# Patient Record
Sex: MALE | Race: WHITE | Employment: FULL TIME | URBAN - METROPOLITAN AREA
[De-identification: names, ages, dates, MRNs, and addresses within clinical notes are randomized per-mention and may not be internally consistent; named-entity substitution may affect disease eponyms.]

---

## 2018-08-10 ENCOUNTER — APPOINTMENT (OUTPATIENT)
Dept: CT IMAGING | Age: 23
End: 2018-08-10
Payer: COMMERCIAL

## 2018-08-10 ENCOUNTER — HOSPITAL ENCOUNTER (EMERGENCY)
Age: 23
Discharge: HOME | End: 2018-08-10
Attending: EMERGENCY MEDICINE
Payer: COMMERCIAL

## 2018-08-10 VITALS
TEMPERATURE: 98.3 F | DIASTOLIC BLOOD PRESSURE: 95 MMHG | RESPIRATION RATE: 18 BRPM | WEIGHT: 270 LBS | OXYGEN SATURATION: 98 % | HEART RATE: 90 BPM | HEIGHT: 73 IN | BODY MASS INDEX: 35.78 KG/M2 | SYSTOLIC BLOOD PRESSURE: 166 MMHG

## 2018-08-10 DIAGNOSIS — R10.9 RIGHT FLANK PAIN: Primary | ICD-10-CM

## 2018-08-10 LAB
BILIRUBIN URINE: NEGATIVE
BLOOD, URINE: NEGATIVE
CLARITY: CLEAR
COLOR: YELLOW
GLUCOSE URINE: NEGATIVE MG/DL
KETONES, URINE: NEGATIVE MG/DL
LEUKOCYTE ESTERASE, URINE: NEGATIVE
NITRITE, URINE: NEGATIVE
PH UA: 6 (ref 5–9)
PROTEIN UA: NEGATIVE MG/DL
SPECIFIC GRAVITY UA: 1.02 (ref 1–1.03)
UROBILINOGEN, URINE: 0.2 E.U./DL

## 2018-08-10 PROCEDURE — 99284 EMERGENCY DEPT VISIT MOD MDM: CPT

## 2018-08-10 PROCEDURE — 81003 URINALYSIS AUTO W/O SCOPE: CPT

## 2018-08-10 PROCEDURE — 74176 CT ABD & PELVIS W/O CONTRAST: CPT

## 2018-08-10 RX ORDER — KETOROLAC TROMETHAMINE 30 MG/ML
30 INJECTION, SOLUTION INTRAMUSCULAR; INTRAVENOUS ONCE
Status: DISCONTINUED | OUTPATIENT
Start: 2018-08-10 | End: 2018-08-10 | Stop reason: HOSPADM

## 2018-08-10 RX ORDER — KETOROLAC TROMETHAMINE 10 MG/1
10 TABLET, FILM COATED ORAL EVERY 8 HOURS PRN
Qty: 15 TABLET | Refills: 0 | Status: SHIPPED | OUTPATIENT
Start: 2018-08-10

## 2018-08-10 ASSESSMENT — PAIN DESCRIPTION - LOCATION: LOCATION: ABDOMEN

## 2018-08-10 ASSESSMENT — PAIN SCALES - GENERAL: PAINLEVEL_OUTOF10: 5

## 2018-08-10 ASSESSMENT — PAIN DESCRIPTION - PAIN TYPE: TYPE: ACUTE PAIN

## 2018-08-10 ASSESSMENT — PAIN DESCRIPTION - ORIENTATION: ORIENTATION: RIGHT

## 2018-08-10 NOTE — ED PROVIDER NOTES
Department of Emergency Medicine   ED  Provider Note  Admit Date/RoomTime: 8/10/2018  2:17 AM  ED Room: 23/23          History of Present Illness:  8/10/18, Time: 2:29 AM         Ly Arauz is a 20 y.o. male presenting to the ED for flank pain, beginning tonight. The complaint has been persistent, moderate in severity, and worsened by nothing. The pt reports noticing pain in his right side earlier this evening at work. He describes initially having an \"annoying pain,\" gradually worsening. He states pain waxes and wanes in intensity. Currently rates the pain 6/10. Per pt, pain does not radiate anywhere. He states he can feel the pain when he takes a deep breath in. Pt denies any recent travel or surgery, edema, calf pain, injury, LOC, headache, weakness, numbness, dizziness, fever, chills, cough, chest pain, sob, abdominal pain, nausea, emesis, diarrhea, urinary sx, or any further complaints at this time. Review of Systems:   Pertinent positives and negatives are stated within HPI, all other systems reviewed and are negative.      --------------------------------------------- PAST HISTORY ---------------------------------------------  Past Medical History:  has no past medical history on file. Past Surgical History:  has no past surgical history on file. Social History:  reports that he has never smoked. He has never used smokeless tobacco. He reports that he does not drink alcohol or use drugs. Family History: family history is not on file. The patients home medications have been reviewed. Allergies: Patient has no known allergies.       ---------------------------------------------------PHYSICAL EXAM--------------------------------------    Constitutional/General: Alert and oriented x3, well appearing, non toxic in NAD  Head: Normocephalic and atraumatic  Eyes: PERRL, EOMI, conjunctiva normal  Mouth: Oropharynx clear, handling secretions, no asymmetry of the posterior oropharynx

## 2025-07-11 ENCOUNTER — APPOINTMENT (OUTPATIENT)
Dept: GENERAL RADIOLOGY | Age: 30
End: 2025-07-11
Payer: COMMERCIAL

## 2025-07-11 ENCOUNTER — HOSPITAL ENCOUNTER (EMERGENCY)
Age: 30
Discharge: HOME | End: 2025-07-11
Payer: COMMERCIAL

## 2025-07-11 VITALS
DIASTOLIC BLOOD PRESSURE: 83 MMHG | OXYGEN SATURATION: 98 % | HEIGHT: 71 IN | RESPIRATION RATE: 16 BRPM | HEART RATE: 88 BPM | SYSTOLIC BLOOD PRESSURE: 156 MMHG | WEIGHT: 315 LBS | TEMPERATURE: 98.1 F | BODY MASS INDEX: 44.1 KG/M2

## 2025-07-11 DIAGNOSIS — L03.113 CELLULITIS OF RIGHT HAND: Primary | ICD-10-CM

## 2025-07-11 DIAGNOSIS — Z51.89 VISIT FOR WOUND CHECK: ICD-10-CM

## 2025-07-11 DIAGNOSIS — S61.259A DOG BITE OF MULTIPLE SITES OF RIGHT HAND AND FINGERS, INITIAL ENCOUNTER: ICD-10-CM
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
## 2025-07-11 DIAGNOSIS — Z23 NEED FOR TETANUS BOOSTER: ICD-10-CM

## 2025-07-11 DIAGNOSIS — R03.0 ELEVATED BLOOD PRESSURE READING WITHOUT DIAGNOSIS OF HYPERTENSION: ICD-10-CM

## 2025-07-11 DIAGNOSIS — S61.451A DOG BITE OF MULTIPLE SITES OF RIGHT HAND AND FINGERS, INITIAL ENCOUNTER: ICD-10-CM

## 2025-07-11 DIAGNOSIS — S60.111A CONTUSION OF RIGHT THUMB NAIL, INITIAL ENCOUNTER: ICD-10-CM

## 2025-07-11 PROCEDURE — 90471 IMMUNIZATION ADMIN: CPT

## 2025-07-11 PROCEDURE — 6360000002 HC RX W HCPCS

## 2025-07-11 PROCEDURE — 6370000000 HC RX 637 (ALT 250 FOR IP)

## 2025-07-11 PROCEDURE — 90714 TD VACC NO PRESV 7 YRS+ IM: CPT

## 2025-07-11 PROCEDURE — 73130 X-RAY EXAM OF HAND: CPT

## 2025-07-11 PROCEDURE — 99284 EMERGENCY DEPT VISIT MOD MDM: CPT

## 2025-07-11 RX ORDER — IBUPROFEN 600 MG/1
600 TABLET, FILM COATED ORAL 3 TIMES DAILY PRN
Qty: 21 TABLET | Refills: 0 | Status: SHIPPED | OUTPATIENT
Start: 2025-07-11

## 2025-07-11 RX ORDER — BACITRACIN ZINC 500 [USP'U]/G
OINTMENT TOPICAL ONCE
Status: COMPLETED | OUTPATIENT
Start: 2025-07-11 | End: 2025-07-11

## 2025-07-11 RX ORDER — IBUPROFEN 600 MG/1
600 TABLET, FILM COATED ORAL ONCE
Status: COMPLETED | OUTPATIENT
Start: 2025-07-11 | End: 2025-07-11

## 2025-07-11 RX ADMIN — AMOXICILLIN AND CLAVULANATE POTASSIUM 1 TABLET: 875; 125 TABLET, FILM COATED ORAL at 23:00

## 2025-07-11 RX ADMIN — CLOSTRIDIUM TETANI TOXOID ANTIGEN (FORMALDEHYDE INACTIVATED) AND CORYNEBACTERIUM DIPHTHERIAE TOXOID ANTIGEN (FORMALDEHYDE INACTIVATED) 0.5 ML: 5; 2 INJECTION, SUSPENSION INTRAMUSCULAR at 23:00

## 2025-07-11 RX ADMIN — BACITRACIN ZINC: 500 OINTMENT TOPICAL at 23:00

## 2025-07-11 RX ADMIN — IBUPROFEN 600 MG: 600 TABLET ORAL at 23:00

## 2025-07-11 ASSESSMENT — PAIN - FUNCTIONAL ASSESSMENT: PAIN_FUNCTIONAL_ASSESSMENT: NONE - DENIES PAIN

## 2025-07-11 ASSESSMENT — LIFESTYLE VARIABLES
HOW MANY STANDARD DRINKS CONTAINING ALCOHOL DO YOU HAVE ON A TYPICAL DAY: PATIENT DOES NOT DRINK
HOW OFTEN DO YOU HAVE A DRINK CONTAINING ALCOHOL: NEVER

## 2025-07-12 NOTE — ED PROVIDER NOTES
Independent CODIE Visit          [facility] EMERGENCY DEPARTMENT  EMERGENCY DEPARTMENT ENCOUNTER        Pt Name: Junior Marin  MRN: [id number]  Birthdate 1995  Date of evaluation: 7/11/2025  Provider: NICKOLAS Mirza - WIN  PCP: Charli Coelho DO  Note Started: 8:51 PM EDT 7/11/25    CHIEF COMPLAINT       Chief Complaint   Patient presents with    Animal Bite     Anterior right hand bit by own dog, puncture. Needs tetanus update       HISTORY OF PRESENT ILLNESS: 1 or more Elements   History from : Patient and chart review  Limitations to history : None    Junior Marin is a 29 y.o. male *** who presents to the emergency department for a dog bite to his right hand and thumb, which occured 1 day(s) prior to arrival.  Was trying to break up a fight between his own dogs when this occurred.  Was bitten by large breed dog.  Did clean the area well.  Has had increased pain, some swelling, and redness which prompted his visit.  Area is mild to moderately sore.  Pain is aggravated by certain movements and pressure on the area.    Symptoms:    Pain:   yes.      Redness:   yes.     Pruritis:   no.     Rash:   no.     Swelling:   yes.     Laceration:   yes.     Abrasion:   no.     Pustule:   no.     Puncture:   yes.     Other:   Bruising to nailbed of right thumb.     Tetanus Status:  more than 10 years ago.    Rabies Risk Assessment:    Dog:   yes     Cat:   no.     Rodent:   no.     Bat:   no.     Other:   N/A.     If Animal Related, Then;                   Abnormal Behavior Witnessed:  NA.                  Geographic Location Where Bitten:  N/A.                  Immunization Status of Animal:  up to date.    Associated Signs & Symptoms:    Fever/Chills:   no.     Swelling:   yes.     Drainage:   no.       Nursing Notes were all reviewed and agreed with or any disagreements were addressed in the HPI.    REVIEW OF SYSTEMS :      Review of Systems    Positives and Pertinent negatives as  treatment management and/or medical review.      All Images taken on 7/11/2025 of patient name: Junior Marin were taken by a [employer] approved registered mobile device via [employer] mobile application and transmitted then stored on a secured I-DISPO  Site located within Media Folder.        DIAGNOSTIC RESULTS   LABS:    No results found for this visit on 07/11/25.    RADIOLOGY:   Images reviewed but not interpreted by the ED provider. Interpretation per the Radiologist below, if available at the time of this note:    XR HAND RIGHT (MIN 3 VIEWS)    (Results Pending)       PROCEDURES      None    CRITICAL CARE TIME      None    MEDICAL DECISION MAKING   ***    Physical Exam: ***    Record Review ***      Differential diagnoses included but not limited to ***.    Diagnostics obtained: ***    *** interpreted by ***.    Results: ***    Medications Given / Offered:   Analgesic(s) PO ibuprofen 600 mg   Antibiotic PO Augmentin 875 mg    Reevaluations:  The nursing notes within the ED encounter and vital signs as below have been reviewed.   Vitals:    07/11/25 1940 07/11/25 2045   BP: (!) 156/83    Pulse: 88    Resp: 16    Temp: 98.1 °F (36.7 °C)    TempSrc: Oral    SpO2: 98%    Weight:  (!) 142.9 kg (315 lb)   Height:  1.803 m (5' 11\")       Re-eval noted below in ED course.  ED Course as of 07/12/25 0113 Fri Jul 11, 2025   2342 Discussed negative x-ray results, plan of care, signs and symptoms of infection, and red flag symptoms to return the ER for such as fever, increased pain, increased swelling, decreased range of motion, red streaking in the hand or arm, foul drainage from the wound, pus from the wound, and increased erythema or unwell feeling.  Wound care, analgesic measures, and antibiotic use discussed.  He understands. [DH]      ED Course User Index  [DH] Domenica Lozano APRN - CNP       Smoking Cessation ***      Consultations:  Spoke with  *** ({admit